# Patient Record
Sex: FEMALE | Race: WHITE | NOT HISPANIC OR LATINO | Employment: OTHER | URBAN - METROPOLITAN AREA
[De-identification: names, ages, dates, MRNs, and addresses within clinical notes are randomized per-mention and may not be internally consistent; named-entity substitution may affect disease eponyms.]

---

## 2019-03-26 ENCOUNTER — OFFICE VISIT (OUTPATIENT)
Dept: SURGERY | Facility: CLINIC | Age: 59
End: 2019-03-26
Payer: MEDICAID

## 2019-03-26 VITALS
HEIGHT: 67 IN | TEMPERATURE: 98 F | BODY MASS INDEX: 25.6 KG/M2 | WEIGHT: 163.13 LBS | DIASTOLIC BLOOD PRESSURE: 80 MMHG | HEART RATE: 70 BPM | SYSTOLIC BLOOD PRESSURE: 140 MMHG

## 2019-03-26 DIAGNOSIS — K21.9 GASTROESOPHAGEAL REFLUX DISEASE, ESOPHAGITIS PRESENCE NOT SPECIFIED: Primary | ICD-10-CM

## 2019-03-26 DIAGNOSIS — G56.00 CARPAL TUNNEL SYNDROME, UNSPECIFIED LATERALITY: ICD-10-CM

## 2019-03-26 DIAGNOSIS — M48.9 CERVICAL SPINE DISEASE: ICD-10-CM

## 2019-03-26 DIAGNOSIS — R10.30 LOWER ABDOMINAL PAIN: ICD-10-CM

## 2019-03-26 DIAGNOSIS — K44.9 HIATAL HERNIA: ICD-10-CM

## 2019-03-26 PROCEDURE — 99203 OFFICE O/P NEW LOW 30 MIN: CPT | Mod: PBBFAC | Performed by: SURGERY

## 2019-03-26 PROCEDURE — 99205 PR OFFICE/OUTPT VISIT, NEW, LEVL V, 60-74 MIN: ICD-10-PCS | Mod: S$PBB,,, | Performed by: SURGERY

## 2019-03-26 PROCEDURE — 99999 PR PBB SHADOW E&M-NEW PATIENT-LVL III: ICD-10-PCS | Mod: PBBFAC,,, | Performed by: SURGERY

## 2019-03-26 PROCEDURE — 99999 PR PBB SHADOW E&M-NEW PATIENT-LVL III: CPT | Mod: PBBFAC,,, | Performed by: SURGERY

## 2019-03-26 PROCEDURE — 99205 OFFICE O/P NEW HI 60 MIN: CPT | Mod: S$PBB,,, | Performed by: SURGERY

## 2019-03-26 RX ORDER — LOSARTAN POTASSIUM 100 MG/1
100 TABLET ORAL
COMMUNITY
Start: 2019-02-01 | End: 2020-02-01

## 2019-03-26 RX ORDER — MONTELUKAST SODIUM 10 MG/1
10 TABLET ORAL
COMMUNITY
Start: 2019-02-01

## 2019-03-26 RX ORDER — FLUTICASONE PROPIONATE 50 MCG
1 SPRAY, SUSPENSION (ML) NASAL
COMMUNITY
Start: 2018-04-02 | End: 2019-04-02

## 2019-03-26 RX ORDER — PANTOPRAZOLE SODIUM 20 MG/1
TABLET, DELAYED RELEASE ORAL
COMMUNITY

## 2019-03-26 RX ORDER — LEVOTHYROXINE SODIUM ANHYDROUS 100 UG/5ML
INJECTION, POWDER, LYOPHILIZED, FOR SOLUTION INTRAVENOUS
COMMUNITY

## 2019-03-26 RX ORDER — ESTRADIOL 1 MG/1
1 TABLET ORAL
COMMUNITY
Start: 2017-02-20

## 2019-03-26 RX ORDER — LOSARTAN POTASSIUM 25 MG/1
TABLET ORAL
COMMUNITY

## 2019-03-26 RX ORDER — PANTOPRAZOLE SODIUM 40 MG/1
TABLET, DELAYED RELEASE ORAL
COMMUNITY

## 2019-03-26 RX ORDER — LEVOTHYROXINE SODIUM 50 UG/1
TABLET ORAL
COMMUNITY
Start: 2019-01-30

## 2019-03-26 RX ORDER — DULOXETIN HYDROCHLORIDE 60 MG/1
CAPSULE, DELAYED RELEASE ORAL
COMMUNITY
Start: 2019-02-01

## 2019-03-26 NOTE — LETTER
March 27, 2019        Srinivas Rosario MD  [address]  [address]  [facility] [address]             [facility]  [address]  [place] [address]  Phone: [phone]   Patient: Sujey De Leon   MR Number: [id number]   YOB: 1960   Date of Visit: 3/26/2019       Dear Dr. Rosario:    I recently saw Sujey De Leon who is a patient of yours. Attached you will find relevant portions of my assessment and plan of care.    If you have questions, please do not hesitate to call me. I look forward to following Sujey De Leon along with you.    Sincerely,      Sue Campos MD            CC  No Recipients    Enclosure

## 2019-03-26 NOTE — PROGRESS NOTES
History & Physical  Surgery      SUBJECTIVE:     Chief Complaint/Reason for Admission: Epigastric Abdominal Pain     History of Present Illness: Sujey De Leon is a 58 y.o. female with history of gastric ulcers (presumed from chronic NSAID use), chronic neck pain, carpal tunnel disease, hypothyroidism, HTN and mood disorder who presents to clinic with abdominal pain, heartburn and regurgitation in the setting of a hiatal hernia. She started having abdominal pain about 10 years ago, primarily in her lower abdomen but occasionally in her epigastrium. She was diagnosed with gastric ulcers which initially thought was the cause of her pain however she has been off NSAIDs for a couple years now. She also notes that she had her gallbladder removed approximately 5 years ago, which did temporarily improve her symptoms however they have since returned. She endorses associated nausea and vomiting. Her symptoms are exacerbated by eating, particularly tomato-based products and citrus, as well as physical activity and laying supine. She does not note symptoms every day. Other symptoms include trouble swallowing with food getting stuck in her chest, regurgitation, sour taste, and retrosternal burning.    She had an EGD (Dr. House, [place], [place] years ago), barium swallow (Dr. Harris, [place], [place]). She was told she has a hiatal hernia. She believes she also may have had a gastric emptying study.    Primary care is Dr. Tee in [place], [place].    She is scheduled for cervical spine fusion in a few weeks at [facility] by Dr. Srinivas Rosario.    Current Outpatient Medications on File Prior to Visit   Medication Sig    biotin-calcium carbonate 800-195 mcg-mg Tab Take 1 tablet by mouth.    DULoxetine (CYMBALTA) 60 MG capsule Cymbalta 60 mg capsule,delayed release   Take 1 capsule every day by oral route.    estradiol (ESTRACE) 1 MG tablet Take 1 mg by mouth.    fluticasone (FLONASE) 50 mcg/actuation nasal spray 1 spray by  Nasal route.    levothyroxine (SYNTHROID) 100 mcg injection levothyroxine    levothyroxine (SYNTHROID) 50 MCG tablet TAKE 1 TABLET BY MOUTH ONCE DAILY    losartan (COZAAR) 100 MG tablet Take 100 mg by mouth.    montelukast (SINGULAIR) 10 mg tablet Take 10 mg by mouth.    pantoprazole (PROTONIX) 20 MG tablet pantoprazole 20 mg tablet,delayed release   TAKE 1 TABLET BY MOUTH ONCE DAILY    losartan (COZAAR) 25 MG tablet losartan 25 mg tablet   Take 1 tablet every day by oral route for 30 days.    montelukast sodium (SINGULAIR ORAL) Singulair    pantoprazole (PROTONIX) 40 MG tablet pantoprazole 40 mg tablet,delayed release   Take 1 tablet every day by oral route for 30 days.     No current facility-administered medications on file prior to visit.        Review of patient's allergies indicates:   Allergen Reactions    Tramadol Anaphylaxis     Breathing problems, chest pains, dizziness      Cefaclor Rash       Past Medical History:   Diagnosis Date    Herniated cervical disc     x 2    Multiple gastric ulcers     presumed from mobic      Past Surgical History:   Procedure Laterality Date    CHOLECYSTECTOMY      TOTAL ABDOMINAL HYSTERECTOMY W/ BILATERAL SALPINGOOPHORECTOMY     Abdominoplasty    History reviewed. No pertinent family history.  Social History     Tobacco Use    Smoking status: Never Smoker    Smokeless tobacco: Never Used   Substance Use Topics    Alcohol use: Not on file    Drug use: Not on file        Review of Systems   Constitutional: Positive for diaphoresis (at night). Negative for appetite change, chills, fever and unexpected weight change.   HENT: Positive for trouble swallowing. Negative for sore throat.    Respiratory: Negative for shortness of breath and wheezing.    Cardiovascular: Negative for chest pain and palpitations.   Gastrointestinal: Positive for abdominal pain (epigastric), constipation, nausea and vomiting. Negative for abdominal distention and diarrhea.    Genitourinary: Negative for difficulty urinating and frequency.        Recent bladder infections   Musculoskeletal: Negative for arthralgias and myalgias.   Skin: Negative for color change and rash.   Psychiatric/Behavioral: Negative for confusion and hallucinations.     OBJECTIVE:     Vital Signs (Most Recent)  Temp: 98.2 °F (36.8 °C) (03/26/19 1314)  Pulse: 70 (03/26/19 1314)  BP: (!) 140/80 (03/26/19 1314)    Physical Exam   Constitutional: She is oriented to person, place, and time. She appears well-developed and well-nourished. No distress.   HENT:   Head: Normocephalic and atraumatic.   Eyes: Conjunctivae and EOM are normal.   Neck: Normal range of motion. Neck supple.   Cardiovascular: Normal rate, regular rhythm and intact distal pulses.   Pulmonary/Chest: Effort normal and breath sounds normal. No respiratory distress.   Abdominal: Soft. She exhibits no distension. There is tenderness (LUQ, RLQ, LLQ). There is no rebound and no guarding.   Musculoskeletal: Normal range of motion. She exhibits no edema.   Neurological: She is alert and oriented to person, place, and time.   Skin: Skin is warm and dry.   Psychiatric: She has a normal mood and affect. Thought content normal.   Vitals reviewed.    Laboratory  none    Diagnostic Results:  none    ASSESSMENT/PLAN:     A/P: Ms. De Leon is a 58 year-old woman with chronic abdominal pain of unknown etiology and symptoms concerning for GERD. She does have a history of gastric ulcers, possibly 2/2 chronic NSAID use.  Her abdominal pain is in her bilateral lower abdominal quadrants, which I explained is unlikely to be due to her hiatal hernia, and may be related to scarring or constipation.  She signed a consent for release of information, and we will try to get records from [facility] including her prior EGD and UGI/GES if performed.  We will arrange for a repeat EGD and HREM to further assess her GERD and regurgitation. If UGI/GES records are unable to  be obtained or weren't performed, then we will repeat these.  We will plan for these studies to be done ~6 weeks following her cervical spine fusion so she can recover from her operation.  She should continue her PPI, sleep with her head elevated, avoid trigger foods and avoid eating 2-3 hours before bed.   All questions were answered to her satisfaction and she is in agreement with this plan. She asked I forward this message to her Neurosurgeon, which I will happily do.  It was a pleasure meeting MsJessica Haley and thank you for this consultation.    Sue Campos  3/26/19